# Patient Record
Sex: FEMALE | Race: WHITE | NOT HISPANIC OR LATINO | Employment: FULL TIME | URBAN - METROPOLITAN AREA
[De-identification: names, ages, dates, MRNs, and addresses within clinical notes are randomized per-mention and may not be internally consistent; named-entity substitution may affect disease eponyms.]

---

## 2020-12-01 ENCOUNTER — HOSPITAL ENCOUNTER (OUTPATIENT)
Dept: LAB | Facility: MEDICAL CENTER | Age: 30
End: 2020-12-01
Attending: FAMILY MEDICINE
Payer: COMMERCIAL

## 2020-12-01 LAB — COVID ORDER STATUS COVID19: NORMAL

## 2020-12-01 PROCEDURE — C9803 HOPD COVID-19 SPEC COLLECT: HCPCS

## 2020-12-01 PROCEDURE — U0003 INFECTIOUS AGENT DETECTION BY NUCLEIC ACID (DNA OR RNA); SEVERE ACUTE RESPIRATORY SYNDROME CORONAVIRUS 2 (SARS-COV-2) (CORONAVIRUS DISEASE [COVID-19]), AMPLIFIED PROBE TECHNIQUE, MAKING USE OF HIGH THROUGHPUT TECHNOLOGIES AS DESCRIBED BY CMS-2020-01-R: HCPCS

## 2020-12-02 LAB
SARS-COV-2 RNA RESP QL NAA+PROBE: DETECTED
SPECIMEN SOURCE: ABNORMAL

## 2022-08-22 ENCOUNTER — HOSPITAL ENCOUNTER (EMERGENCY)
Facility: MEDICAL CENTER | Age: 32
End: 2022-08-22
Attending: EMERGENCY MEDICINE
Payer: COMMERCIAL

## 2022-08-22 VITALS
OXYGEN SATURATION: 97 % | WEIGHT: 177.03 LBS | SYSTOLIC BLOOD PRESSURE: 135 MMHG | HEART RATE: 78 BPM | BODY MASS INDEX: 30.22 KG/M2 | RESPIRATION RATE: 16 BRPM | HEIGHT: 64 IN | DIASTOLIC BLOOD PRESSURE: 78 MMHG | TEMPERATURE: 97.5 F

## 2022-08-22 DIAGNOSIS — L50.9 URTICARIA: ICD-10-CM

## 2022-08-22 DIAGNOSIS — T78.40XA ALLERGIC REACTION, INITIAL ENCOUNTER: ICD-10-CM

## 2022-08-22 PROCEDURE — 99283 EMERGENCY DEPT VISIT LOW MDM: CPT

## 2022-08-22 PROCEDURE — 700102 HCHG RX REV CODE 250 W/ 637 OVERRIDE(OP): Performed by: EMERGENCY MEDICINE

## 2022-08-22 PROCEDURE — A9270 NON-COVERED ITEM OR SERVICE: HCPCS | Performed by: EMERGENCY MEDICINE

## 2022-08-22 PROCEDURE — 700111 HCHG RX REV CODE 636 W/ 250 OVERRIDE (IP): Performed by: EMERGENCY MEDICINE

## 2022-08-22 RX ORDER — DIPHENHYDRAMINE HCL 25 MG
25 TABLET ORAL ONCE
Status: COMPLETED | OUTPATIENT
Start: 2022-08-22 | End: 2022-08-22

## 2022-08-22 RX ORDER — PREDNISONE 50 MG/1
50 TABLET ORAL DAILY
Qty: 4 TABLET | Refills: 0 | Status: SHIPPED | OUTPATIENT
Start: 2022-08-22 | End: 2022-08-26

## 2022-08-22 RX ORDER — DIPHENHYDRAMINE HCL 25 MG
25 CAPSULE ORAL EVERY 6 HOURS PRN
Qty: 20 CAPSULE | Refills: 0 | Status: SHIPPED | OUTPATIENT
Start: 2022-08-22 | End: 2022-08-27

## 2022-08-22 RX ORDER — PREDNISONE 20 MG/1
60 TABLET ORAL ONCE
Status: COMPLETED | OUTPATIENT
Start: 2022-08-22 | End: 2022-08-22

## 2022-08-22 RX ADMIN — DIPHENHYDRAMINE HYDROCHLORIDE 25 MG: 25 TABLET ORAL at 01:45

## 2022-08-22 RX ADMIN — PREDNISONE 60 MG: 20 TABLET ORAL at 01:45

## 2022-08-22 NOTE — ED PROVIDER NOTES
ED Provider Note    ED Provider Note    Scribed for Carlos Traore MD by Carlos Traore M.D.. 8/22/2022, 1:15 AM.    Primary care provider: No primary care provider on file.  Means of arrival: Private  History obtained from: Patient  History limited by: None    CHIEF COMPLAINT  Chief Complaint   Patient presents with    Allergic Reaction     Tongue feels swollen, difficulty swallowing, and feeling shaky x2 hours; hives on chest yesterday; diarrhea x1 episode in the last 30 minutes       HPI  Veronika Seth is a 31 y.o. female who presents to the Emergency Department for evaluation of potential allergic reaction.  Patient is from out of town and visiting, staying in a hotel.  No obvious allergic exposures elicited though she noted sensation of hives starting yesterday after she was using a hotel towel.  This evening a few hours after dinner the patient noted a sensation of dry mouth and uncomfortable swallowing.  She notes a sensation of swelling to her tongue as well.  This is been going on for about 2 hours prior to arrival.  She also noted hives yesterday as delineated above but also still somewhat more mild hive type lesions to the anterior chest this evening as well.  No vomiting, but did note a single episode of diarrhea tonight.  No difficulty breathing, no wheezing.  No ill contacts known, no fever.  She has had somewhat similar symptoms in the past of unclear etiology.  She has not seen an allergist recently.  No history of anaphylaxis, no history of requiring an epinephrine pen.  She has had no antihistamines prior to arrival.    REVIEW OF SYSTEMS  Pertinent positives include painful swallowing, sensation of tongue swelling, hives. Pertinent negatives include no dyspnea, no vomiting, no fever.      PAST MEDICAL HISTORY  Otherwise thought to be healthy    SURGICAL HISTORY  patient denies any surgical history    SOCIAL HISTORY  Social History     Tobacco Use    Smoking status: Never     "Smokeless tobacco: Never   Vaping Use    Vaping Use: Never used   Substance Use Topics    Alcohol use: Yes     Comment: \"maybe once a week, maybe 1-2 drinks\"    Drug use: Yes     Types: Oral     Comment: marijuana edibles      Social History     Substance and Sexual Activity   Drug Use Yes    Types: Oral    Comment: marijuana edibles       FAMILY HISTORY  Noncontributory    CURRENT MEDICATIONS  Home Medications       Reviewed by Reba You R.N. (Registered Nurse) on 08/22/22 at 0028  Med List Status: Not Addressed     Medication Last Dose Status        Patient Chevy Taking any Medications                           ALLERGIES  No Known Allergies    PHYSICAL EXAM  VITAL SIGNS: BP (!) 147/94   Pulse 80   Temp 36.7 °C (98.1 °F) (Temporal)   Resp 18   Ht 1.626 m (5' 4\")   Wt 80.3 kg (177 lb 0.5 oz)   LMP 08/01/2022   SpO2 98%   BMI 30.39 kg/m²     General: Alert, no acute distress  Skin: Warm, dry, normal for ethnicity.  Blanching urticarial lesions consistent with hives to the anterior chest.  Head: Normocephalic, atraumatic  Neck: Trachea midline, no tenderness, no audible stridor.  Eye: PERRL, normal conjunctiva  ENMT: Oral mucosa pink and dry, no pharyngeal erythema or exudate.  Uvula midline, no apparent edema to the tongue, pharynx, or lips.  Cardiovascular: Regular rate and rhythm, No murmur, Normal peripheral perfusion  Respiratory: Lungs CTA, respirations are non-labored, breath sounds are equal.  No wheeze, no rhonchi.  Gastrointestinal: Soft, nontender, non distended  Musculoskeletal: No swelling, no deformity  Neurological: Alert and oriented to person, place, time, and situation  Lymphatics: No lymphadenopathy  Psychiatric: Cooperative, mildly anxious, otherwise appropriate mood & affect        COURSE & MEDICAL DECISION MAKING  Pertinent Labs & Imaging studies reviewed. (See chart for details)    1:15 AM - Patient seen and examined at bedside. Patient will be treated with prednisone and " "diphenhydramine.  So thorough history of performed detailed physical exam to evaluate her symptoms. The differential diagnoses include but are not limited to: Allergic reaction, allergic urticaria    Patient Vitals for the past 24 hrs:   BP Temp Temp src Pulse Resp SpO2 Height Weight   08/22/22 0023 -- -- -- -- -- -- 1.626 m (5' 4\") 80.3 kg (177 lb 0.5 oz)   08/22/22 0022 (!) 147/94 36.7 °C (98.1 °F) Temporal 80 18 98 % -- --      HTN/IDDM FOLLOW UP:  The patient is referred to a primary physician for blood pressure management, diabetic screening, and for all other preventive health concerns     Decision Making:  This is a 31 y.o. year old female who presents with sensation of painful swallowing and feeling as if her tongue were swollen.  Somewhat similar symptoms in the past of unknown allergic etiology.  Patient thankfully is quite well in appearance.  She demonstrates no wheezing, no stridor, no evidence of any increased work of breathing.  She is not hypoxic and has clear lungs on exam.  There is no significant edema noted to the tongue or the lips, pharynx also appears within normal limits and the uvula is at the midline.  I do suspect allergic reaction given the symptoms with thankfully this is not consistent with anaphylaxis.  No indication for prolonged observation nor of administration of epinephrine at this time.  Treated with and written for antihistamine and steroid.  Patient comfortable following up with allergist and primary care in her home state.    The patient will return for new or worsening symptoms and is stable at the time of discharge.    Patient has had high blood pressure while in the emergency department, felt likely secondary to medical condition. Counseled patient to monitor blood pressure at home and follow up with primary care physician.      DISPOSITION:  Patient will be discharged home in stable condition.    FOLLOW UP:  Your primary care provider    Schedule an appointment as soon as " possible for a visit       Allergist of your choice    Schedule an appointment as soon as possible for a visit         OUTPATIENT MEDICATIONS:  New Prescriptions    DIPHENHYDRAMINE (BENADRYL) 25 MG CAPSULE    Take 1 Capsule by mouth every 6 hours as needed for Itching or Rash for up to 5 days.    PREDNISONE (DELTASONE) 50 MG TAB    Take 1 Tablet by mouth every day for 4 days.          FINAL IMPRESSION  1. Allergic reaction, initial encounter    2. Urticaria          Carlos BARRON M.D. (Kahty), am scribing for, and in the presence of, Carlos Traore MD.    Electronically signed by: Carlos Traore M.D. (Scribe), 8/22/2022    ICarlos MD personally performed the services described in this documentation, as scribed by Carlos Traore M.D. in my presence, and it is both accurate and complete    The note accurately reflects work and decisions made by me.  Carlos Traore M.D.  8/22/2022  1:21 AM

## 2022-08-22 NOTE — ED TRIAGE NOTES
"Chief Complaint   Patient presents with    Allergic Reaction     Tongue feels swollen, difficulty swallowing, and feeling shaky x2 hours; hives on chest yesterday; diarrhea x1 episode in the last 30 minutes     Pt ambulatory to triage for above complaint. Pt has bumps on the back of her tongue that she states she has never had before. Pt is clearing her throat in triage and states that swallowing is not painful but \"feels thick.\" Pt does not have any known allergies. Pt is visiting from [place] and is staying in a hotel so she isn't sure what she could be reacting to. Pt has not taken any medications prior to arrival.      Pt is alert/oriented and follows commands. Pt speaking in full sentences and responds appropriately to questions. No acute distress noted in triage and respirations are even and unlabored.     Pt placed in lobby and educated on triage process. Pt encouraged to alert staff for any changes in condition.  "